# Patient Record
Sex: FEMALE | Race: WHITE | NOT HISPANIC OR LATINO | Employment: FULL TIME | URBAN - METROPOLITAN AREA
[De-identification: names, ages, dates, MRNs, and addresses within clinical notes are randomized per-mention and may not be internally consistent; named-entity substitution may affect disease eponyms.]

---

## 2018-06-01 ENCOUNTER — OFFICE VISIT (OUTPATIENT)
Dept: FAMILY MEDICINE CLINIC | Facility: CLINIC | Age: 23
End: 2018-06-01

## 2018-06-01 VITALS
TEMPERATURE: 98.7 F | DIASTOLIC BLOOD PRESSURE: 62 MMHG | OXYGEN SATURATION: 98 % | HEIGHT: 66 IN | BODY MASS INDEX: 26.81 KG/M2 | SYSTOLIC BLOOD PRESSURE: 104 MMHG | WEIGHT: 166.8 LBS | HEART RATE: 91 BPM

## 2018-06-01 DIAGNOSIS — R53.82 CHRONIC FATIGUE: ICD-10-CM

## 2018-06-01 DIAGNOSIS — Z13.220 SCREENING FOR CHOLESTEROL LEVEL: ICD-10-CM

## 2018-06-01 DIAGNOSIS — N92.0 MENORRHAGIA WITH REGULAR CYCLE: ICD-10-CM

## 2018-06-01 DIAGNOSIS — R73.9 HYPERGLYCEMIA: ICD-10-CM

## 2018-06-01 DIAGNOSIS — L30.1 DYSHIDROTIC ECZEMA: ICD-10-CM

## 2018-06-01 DIAGNOSIS — E66.3 OVERWEIGHT (BMI 25.0-29.9): Primary | ICD-10-CM

## 2018-06-01 DIAGNOSIS — R09.89 CHEST CONGESTION: ICD-10-CM

## 2018-06-01 PROBLEM — J30.1 ACUTE SEASONAL ALLERGIC RHINITIS DUE TO POLLEN: Status: ACTIVE | Noted: 2018-06-01

## 2018-06-01 PROCEDURE — 99204 OFFICE O/P NEW MOD 45 MIN: CPT | Performed by: FAMILY MEDICINE

## 2018-06-01 RX ORDER — MONTELUKAST SODIUM 10 MG/1
10 TABLET ORAL NIGHTLY
Qty: 30 TABLET | Refills: 6 | OUTPATIENT
Start: 2018-06-01 | End: 2019-12-09

## 2018-06-01 NOTE — PROGRESS NOTES
Chrissie Varela is a 22 y.o. female.     Chief Complaint   Patient presents with   • Establish Care     New PT   • Asthma   • Allergies   • Fatigue   • Rash     On hands that come and go        HPI     Pt is a pleasant 22 y.o. YO female here for Asthma, Fatigue, allergies.  PMH includes Asthma and allergies.    Lungs/ Asthma: 2-3 times a year she gets a cold and it last for a month/ gets a walking PNA, needs steroids and an inhaler.  Currently she is asymptomatic, but has a family history of asthma.  As she gets very wheezy with a persistent cough, she wonders if she has an underlying mild asthma.  She has never had testing previously.    Never had a PAP smear    Allergies - main concern, very thick congestion, difficult to sleep.  Starts to cough and can't stop.  He is taking over-the-counter medications with some improvement.  Additionally she has noticed some food triggers, especially corn.    Always tired, always bloated, exercising - raining for a half marathon    Significant fatigue - feels tired, does not resolve after getting a good night sleep, some bloating and abdominal issues.    She gets a rash on her hands, intermittent for the last 3 years.  Very itchy, on the knuckles, and will crack and bleed.  His improved with topical steroids but often takes months to resolve.    The following portions of the patient's history were reviewed and updated as appropriate: allergies, current medications, past family history, past medical history, past social history, past surgical history and problem list.    Review of Systems   Constitutional: Negative for fever and unexpected weight change.   HENT: Negative for dental problem.    Respiratory: Negative for shortness of breath.    Cardiovascular: Negative for chest pain.   Gastrointestinal: Negative for blood in stool.   Genitourinary: Negative for dysuria.   Skin: Negative for rash.   Allergic/Immunologic: Negative for environmental allergies.   Neurological:  Negative for syncope.   Psychiatric/Behavioral: The patient is not nervous/anxious.        Objective  Vitals:    06/01/18 0839   BP: 104/62   Pulse: 91   Temp: 98.7 °F (37.1 °C)   SpO2: 98%        Physical Exam   Constitutional: She is oriented to person, place, and time. She appears well-developed and well-nourished. No distress.   HENT:   Head: Normocephalic.   Nose: Nose normal.   Eyes: EOM are normal.   Cardiovascular: Normal rate, regular rhythm, normal heart sounds and intact distal pulses.    No murmur heard.  Pulmonary/Chest: Effort normal and breath sounds normal. No respiratory distress.   Musculoskeletal: Normal range of motion.   Neurological: She is alert and oriented to person, place, and time.   Skin: Skin is warm and dry. No rash noted.   Psychiatric: She has a normal mood and affect. Her behavior is normal. Judgment and thought content normal.   Nursing note and vitals reviewed.        Current Outpatient Prescriptions:   •  betamethasone valerate (VALISONE) 0.1 % ointment, Apply  topically 2 (Two) Times a Day., Disp: 45 g, Rfl: 6  •  montelukast (SINGULAIR) 10 MG tablet, Take 1 tablet by mouth Every Night., Disp: 30 tablet, Rfl: 6    Procedures    Lab Results (most recent)     None              Chrissie was seen today for establish care, asthma, allergies, fatigue and rash.    Diagnoses and all orders for this visit:    Overweight (BMI 25.0-29.9)  -     Comprehensive Metabolic Panel    Hyperglycemia  -     Comprehensive Metabolic Panel  -     TSH Rfx On Abnormal To Free T4  -     Vitamin D 25 Hydroxy  -     Hemoglobin A1c    Dyshidrotic eczema  -     betamethasone valerate (VALISONE) 0.1 % ointment; Apply  topically 2 (Two) Times a Day.    Chest congestion    Chronic fatigue  -     TSH Rfx On Abnormal To Free T4  -     Vitamin D 25 Hydroxy  -     CBC Auto Differential    Screening for cholesterol level  -     Lipid Panel    Menorrhagia with regular cycle  -     CBC Auto Differential    Other orders  -      montelukast (SINGULAIR) 10 MG tablet; Take 1 tablet by mouth Every Night.    Patient is a pleasant 22-year-old female here as a new patient.  She has multiple complaints.  Evaluation as above.  Follow-up for internal wellness exam with Pap smear.    Dyshidrotic eczema: Patient to use occlusion therapy with glove at night with betamethasone and coconut oil or Vaseline.    Chronic congestion with possible asthmatic tendencies: Start montelukast, if not improving in 2 weeks may stop.  If not improving significantly with that we may consider pulmonary function testing.    Patient with a history of hyperglycemia, follow-up today.    Return if symptoms worsen or fail to improve, for Annual physical with PAP and labs prior .      Cookie Molina MD

## 2018-06-08 LAB
25(OH)D3+25(OH)D2 SERPL-MCNC: 22.9 NG/ML (ref 30–100)
ALBUMIN SERPL-MCNC: 4.8 G/DL (ref 3.5–5.2)
ALBUMIN/GLOB SERPL: 1.8 G/DL
ALP SERPL-CCNC: 49 U/L (ref 39–117)
ALT SERPL-CCNC: 17 U/L (ref 1–33)
AST SERPL-CCNC: 17 U/L (ref 1–32)
BASOPHILS # BLD AUTO: 0.01 10*3/MM3 (ref 0–0.2)
BASOPHILS NFR BLD AUTO: 0.3 % (ref 0–1.5)
BILIRUB SERPL-MCNC: 0.5 MG/DL (ref 0.1–1.2)
BUN SERPL-MCNC: 11 MG/DL (ref 6–20)
BUN/CREAT SERPL: 13.3 (ref 7–25)
CALCIUM SERPL-MCNC: 9.8 MG/DL (ref 8.6–10.5)
CHLORIDE SERPL-SCNC: 102 MMOL/L (ref 98–107)
CHOLEST SERPL-MCNC: 166 MG/DL (ref 0–200)
CO2 SERPL-SCNC: 23.1 MMOL/L (ref 22–29)
CREAT SERPL-MCNC: 0.83 MG/DL (ref 0.57–1)
EOSINOPHIL # BLD AUTO: 0.16 10*3/MM3 (ref 0–0.7)
EOSINOPHIL NFR BLD AUTO: 4.2 % (ref 0.3–6.2)
ERYTHROCYTE [DISTWIDTH] IN BLOOD BY AUTOMATED COUNT: 13.1 % (ref 11.7–13)
GFR SERPLBLD CREATININE-BSD FMLA CKD-EPI: 103 ML/MIN/1.73
GFR SERPLBLD CREATININE-BSD FMLA CKD-EPI: 85 ML/MIN/1.73
GLOBULIN SER CALC-MCNC: 2.7 GM/DL
GLUCOSE SERPL-MCNC: 79 MG/DL (ref 65–99)
HBA1C MFR BLD: 4.86 % (ref 4.8–5.6)
HCT VFR BLD AUTO: 39.4 % (ref 35.6–45.5)
HDLC SERPL-MCNC: 46 MG/DL (ref 40–60)
HGB BLD-MCNC: 12.9 G/DL (ref 11.9–15.5)
IMM GRANULOCYTES # BLD: 0 10*3/MM3 (ref 0–0.03)
IMM GRANULOCYTES NFR BLD: 0 % (ref 0–0.5)
LDLC SERPL CALC-MCNC: 110 MG/DL (ref 0–100)
LYMPHOCYTES # BLD AUTO: 1.84 10*3/MM3 (ref 0.9–4.8)
LYMPHOCYTES NFR BLD AUTO: 47.9 % (ref 19.6–45.3)
MCH RBC QN AUTO: 29.1 PG (ref 26.9–32)
MCHC RBC AUTO-ENTMCNC: 32.7 G/DL (ref 32.4–36.3)
MCV RBC AUTO: 88.7 FL (ref 80.5–98.2)
MONOCYTES # BLD AUTO: 0.35 10*3/MM3 (ref 0.2–1.2)
MONOCYTES NFR BLD AUTO: 9.1 % (ref 5–12)
NEUTROPHILS # BLD AUTO: 1.48 10*3/MM3 (ref 1.9–8.1)
NEUTROPHILS NFR BLD AUTO: 38.5 % (ref 42.7–76)
PLATELET # BLD AUTO: 229 10*3/MM3 (ref 140–500)
POTASSIUM SERPL-SCNC: 4.2 MMOL/L (ref 3.5–5.2)
PROT SERPL-MCNC: 7.5 G/DL (ref 6–8.5)
RBC # BLD AUTO: 4.44 10*6/MM3 (ref 3.9–5.2)
SODIUM SERPL-SCNC: 140 MMOL/L (ref 136–145)
TRIGL SERPL-MCNC: 48 MG/DL (ref 0–150)
TSH SERPL DL<=0.005 MIU/L-ACNC: 3.15 MIU/ML (ref 0.27–4.2)
VLDLC SERPL CALC-MCNC: 9.6 MG/DL (ref 5–40)
WBC # BLD AUTO: 3.84 10*3/MM3 (ref 4.5–10.7)

## 2018-06-11 NOTE — PROGRESS NOTES
Please call patient with results. Kidney and liver functions look normal. Diabetes screen is negative. Cholesterol is normal. Thyroid function is normal,  No anemia, Low Vit D - please take 2000 units OTC daily.  We can repeat these next year.     Thank You,    Cookie Molina M.D.

## 2018-06-13 ENCOUNTER — PROCEDURE VISIT (OUTPATIENT)
Dept: FAMILY MEDICINE CLINIC | Facility: CLINIC | Age: 23
End: 2018-06-13

## 2018-06-13 VITALS
HEIGHT: 66 IN | DIASTOLIC BLOOD PRESSURE: 78 MMHG | SYSTOLIC BLOOD PRESSURE: 120 MMHG | OXYGEN SATURATION: 98 % | TEMPERATURE: 98 F | WEIGHT: 160 LBS | BODY MASS INDEX: 25.71 KG/M2 | HEART RATE: 78 BPM

## 2018-06-13 DIAGNOSIS — Z12.4 PAP SMEAR FOR CERVICAL CANCER SCREENING: ICD-10-CM

## 2018-06-13 DIAGNOSIS — N89.8 VAGINAL DISCHARGE: ICD-10-CM

## 2018-06-13 DIAGNOSIS — L30.9 DERMATITIS: ICD-10-CM

## 2018-06-13 DIAGNOSIS — Z00.00 HEALTH MAINTENANCE EXAMINATION: Primary | ICD-10-CM

## 2018-06-13 PROCEDURE — 99395 PREV VISIT EST AGE 18-39: CPT | Performed by: FAMILY MEDICINE

## 2018-06-13 NOTE — PROGRESS NOTES
Chrissie Varela is a 22 y.o. female.     Chief Complaint   Patient presents with   • Gynecologic Exam     and follow up on labs no complains sometimes discharge       HPI     Pt is a pleasant 22 y.o. YO female here for annual physical and PAP smear.  PMH includes allergies and hyperglycemia.  Sting labs performed earlier this month with normal findings except for a low vitamin D.  Reviewed with patient.  Due for Pap smear, she has never had a Pap smear previously.  Some odorous vaginal discharge, baths daily.  Manganous with her .  Only one lifetime partner.       The following portions of the patient's history were reviewed and updated as appropriate: allergies, current medications, past family history, past medical history, past social history, past surgical history and problem list.    Review of Systems   Constitutional: Negative for fever and unexpected weight change.   HENT: Negative for dental problem.    Respiratory: Negative for shortness of breath.    Cardiovascular: Negative for chest pain.   Gastrointestinal: Negative for blood in stool.   Genitourinary: Negative for dysuria.   Skin: Negative for rash.   Allergic/Immunologic: Negative for environmental allergies.   Neurological: Negative for syncope.   Psychiatric/Behavioral: The patient is not nervous/anxious.    All other systems reviewed and are negative.      Objective  Vitals:    06/13/18 1009   BP: 120/78   Pulse: 78   Temp: 98 °F (36.7 °C)   SpO2: 98%        Physical Exam   Constitutional: She is oriented to person, place, and time. She appears well-developed and well-nourished.   Neck: No thyromegaly present.   Cardiovascular: Normal rate, regular rhythm and normal heart sounds.  Exam reveals no gallop.    No murmur heard.  Pulmonary/Chest: Effort normal and breath sounds normal. She has no wheezes. She has no rales. She exhibits no tenderness. Right breast exhibits no mass, no nipple discharge and no skin change. Left breast exhibits no  mass, no nipple discharge and no skin change.   Abdominal: There is no tenderness.   Genitourinary: Uterus normal. Pelvic exam was performed with patient supine. There is no rash, tenderness or lesion on the right labia. There is no rash, tenderness or lesion on the left labia. Uterus is not enlarged and not tender. Cervix exhibits discharge. Cervix exhibits no motion tenderness and no friability. Right adnexum displays no mass, no tenderness and no fullness. Left adnexum displays no mass, no tenderness and no fullness. No erythema, tenderness or bleeding in the vagina. Vaginal discharge found.   Genitourinary Comments: Dark brown colored discharge with a fishy odor.    Lymphadenopathy:        Right: No inguinal adenopathy present.        Left: No inguinal adenopathy present.   Neurological: She is alert and oriented to person, place, and time.   Skin: Skin is warm. No rash noted.   Erythematous dermatitis of the belly button, no bleeding or descharge.    Psychiatric: She has a normal mood and affect. Her behavior is normal. Judgment and thought content normal.   Vitals reviewed.        Current Outpatient Prescriptions:   •  betamethasone valerate (VALISONE) 0.1 % ointment, Apply  topically 2 (Two) Times a Day., Disp: 45 g, Rfl: 6  •  montelukast (SINGULAIR) 10 MG tablet, Take 1 tablet by mouth Every Night., Disp: 30 tablet, Rfl: 6  •  nystatin-triamcinolone (MYCOLOG II) 251304-8.1 UNIT/GM-% cream, Apply  topically 2 (Two) Times a Day., Disp: 30 g, Rfl: 2    Procedures    Lab Results (most recent)     Lindy Dickens was seen today for gynecologic exam.    Diagnoses and all orders for this visit:    Health maintenance examination    Pap smear for cervical cancer screening    Vaginal discharge  -     NuSwab BV & Candida - Swab, Vagina    Dermatitis  -     nystatin-triamcinolone (MYCOLOG II) 460282-5.1 UNIT/GM-% cream; Apply  topically 2 (Two) Times a Day.    Smear today, reviewed labs.  Start vitamin D  supplementation 2000 g daily.  BV/ yeast for vaginal discharge as above.  Some dermatitis in the belly button, start taking cream BID for 10 days as above.      Return if symptoms worsen or fail to improve.      Cookie Molina MD

## 2018-06-15 LAB
A VAGINAE DNA VAG QL NAA+PROBE: NORMAL SCORE
BVAB2 DNA VAG QL NAA+PROBE: NORMAL SCORE
C ALBICANS DNA VAG QL NAA+PROBE: NEGATIVE
C GLABRATA DNA VAG QL NAA+PROBE: NEGATIVE
MEGA1 DNA VAG QL NAA+PROBE: NORMAL SCORE

## 2018-06-19 LAB
CONV .: ABNORMAL
CYTOLOGIST CVX/VAG CYTO: ABNORMAL
CYTOLOGY CVX/VAG DOC THIN PREP: ABNORMAL
DX ICD CODE: ABNORMAL
DX ICD CODE: ABNORMAL
HIV 1 & 2 AB SER-IMP: ABNORMAL
HPV I/H RISK 1 DNA CVX QL PROBE+SIG AMP: NEGATIVE
OTHER STN SPEC: ABNORMAL
PATH REPORT.FINAL DX SPEC: ABNORMAL
PATHOLOGIST CVX/VAG CYTO: ABNORMAL
STAT OF ADQ CVX/VAG CYTO-IMP: ABNORMAL

## 2019-01-08 ENCOUNTER — OFFICE VISIT (OUTPATIENT)
Dept: FAMILY MEDICINE CLINIC | Facility: CLINIC | Age: 24
End: 2019-01-08

## 2019-01-08 VITALS
BODY MASS INDEX: 28.28 KG/M2 | SYSTOLIC BLOOD PRESSURE: 120 MMHG | HEIGHT: 66 IN | DIASTOLIC BLOOD PRESSURE: 62 MMHG | HEART RATE: 100 BPM | WEIGHT: 176 LBS | OXYGEN SATURATION: 99 % | TEMPERATURE: 98.7 F

## 2019-01-08 DIAGNOSIS — L50.9 URTICARIA: Primary | ICD-10-CM

## 2019-01-08 DIAGNOSIS — J30.1 ACUTE SEASONAL ALLERGIC RHINITIS DUE TO POLLEN: ICD-10-CM

## 2019-01-08 PROCEDURE — 99213 OFFICE O/P EST LOW 20 MIN: CPT | Performed by: FAMILY MEDICINE

## 2019-01-08 NOTE — PROGRESS NOTES
Chrissie Varela is a 23 y.o. female.     Chief Complaint   Patient presents with   • Allergies     Pt would like to be tested for allerys    • Rash       HPI     Pt is a pleasant 23 y.o. YO female here for allergies and rash. These have been present for a few months, she has episodic urticaria that lasts for a couple hours and is present over her trunk and arms and chest.  It is pruritic and often red with raised lesions.  She has not found factor that triggers the rash.  She does have allergies      The following portions of the patient's history were reviewed and updated as appropriate: allergies, current medications, past family history, past medical history, past social history, past surgical history and problem list.    Review of Systems   Skin: Positive for rash.   Psychiatric/Behavioral: Negative for behavioral problems.       Objective  Vitals:    01/08/19 1300   BP: 120/62   Pulse: 100   Temp: 98.7 °F (37.1 °C)   SpO2: 99%        Physical Exam   Constitutional: She is oriented to person, place, and time. She appears well-developed and well-nourished. No distress.   HENT:   Head: Normocephalic.   Nose: Nose normal.   Eyes: EOM are normal. No scleral icterus.   Pulmonary/Chest: Effort normal. No respiratory distress.   Musculoskeletal: Normal range of motion.   Neurological: She is alert and oriented to person, place, and time.   Skin: Skin is warm and dry. No rash noted.   Rash currently, some erythematous patches with raised lesion seen on imaging from phone.   Psychiatric: She has a normal mood and affect. Her behavior is normal. Judgment and thought content normal.   Nursing note and vitals reviewed.        Current Outpatient Medications:   •  betamethasone valerate (VALISONE) 0.1 % ointment, Apply  topically 2 (Two) Times a Day., Disp: 45 g, Rfl: 6  •  montelukast (SINGULAIR) 10 MG tablet, Take 1 tablet by mouth Every Night., Disp: 30 tablet, Rfl: 6  •  nystatin-triamcinolone (MYCOLOG II) 715808-4.1  UNIT/GM-% cream, Apply  topically 2 (Two) Times a Day., Disp: 30 g, Rfl: 2  •  VITAMIN D, CHOLECALCIFEROL, PO, Take  by mouth., Disp: , Rfl:     Procedures    Lab Results (most recent)     None              Chrissie was seen today for allergies and rash.    Diagnoses and all orders for this visit:    Urticaria  -     Ambulatory Referral to Allergy    Acute seasonal allergic rhinitis due to pollen  -     Ambulatory Referral to Allergy    Patient was urticaria and allergies, referral to allergist.  Recommend Zyrtec and Benadryl to treat symptoms.  Avoid items that could trigger allergic reaction, no identified options currently.  She was told that she was allergic to corn in the past, eating currently without symptoms.      Return if symptoms worsen or fail to improve.      Cookie Molina MD

## 2019-12-09 ENCOUNTER — HOSPITAL ENCOUNTER (EMERGENCY)
Facility: HOSPITAL | Age: 24
Discharge: HOME | End: 2019-12-09
Attending: EMERGENCY MEDICINE | Admitting: EMERGENCY MEDICINE

## 2019-12-09 ENCOUNTER — APPOINTMENT (OUTPATIENT)
Dept: GENERAL RADIOLOGY | Facility: HOSPITAL | Age: 24
End: 2019-12-09

## 2019-12-09 VITALS
HEART RATE: 69 BPM | OXYGEN SATURATION: 99 % | RESPIRATION RATE: 16 BRPM | SYSTOLIC BLOOD PRESSURE: 111 MMHG | TEMPERATURE: 97.9 F | DIASTOLIC BLOOD PRESSURE: 82 MMHG

## 2019-12-09 DIAGNOSIS — R07.89 ATYPICAL CHEST PAIN: Primary | ICD-10-CM

## 2019-12-09 LAB
ALBUMIN SERPL-MCNC: 4.7 G/DL (ref 3.5–5.2)
ALBUMIN/GLOB SERPL: 1.5 G/DL
ALP SERPL-CCNC: 47 U/L (ref 39–117)
ALT SERPL W P-5'-P-CCNC: 21 U/L (ref 1–33)
ANION GAP SERPL CALCULATED.3IONS-SCNC: 11.7 MMOL/L (ref 5–15)
AST SERPL-CCNC: 16 U/L (ref 1–32)
BASOPHILS # BLD AUTO: 0.02 10*3/MM3 (ref 0–0.2)
BASOPHILS NFR BLD AUTO: 0.4 % (ref 0–1.5)
BILIRUB SERPL-MCNC: 0.3 MG/DL (ref 0.2–1.2)
BUN BLD-MCNC: 11 MG/DL (ref 6–20)
BUN/CREAT SERPL: 16.9 (ref 7–25)
CALCIUM SPEC-SCNC: 9.5 MG/DL (ref 8.6–10.5)
CHLORIDE SERPL-SCNC: 109 MMOL/L (ref 98–107)
CO2 SERPL-SCNC: 22.3 MMOL/L (ref 22–29)
CREAT BLD-MCNC: 0.65 MG/DL (ref 0.57–1)
D DIMER PPP FEU-MCNC: 0.28 MCGFEU/ML (ref 0–0.49)
DEPRECATED RDW RBC AUTO: 39.7 FL (ref 37–54)
EOSINOPHIL # BLD AUTO: 0.24 10*3/MM3 (ref 0–0.4)
EOSINOPHIL NFR BLD AUTO: 4.8 % (ref 0.3–6.2)
ERYTHROCYTE [DISTWIDTH] IN BLOOD BY AUTOMATED COUNT: 12.5 % (ref 12.3–15.4)
GFR SERPL CREATININE-BSD FRML MDRD: 111 ML/MIN/1.73
GLOBULIN UR ELPH-MCNC: 3.1 GM/DL
GLUCOSE BLD-MCNC: 82 MG/DL (ref 65–99)
HCT VFR BLD AUTO: 36.8 % (ref 34–46.6)
HGB BLD-MCNC: 12.7 G/DL (ref 12–15.9)
IMM GRANULOCYTES # BLD AUTO: 0.01 10*3/MM3 (ref 0–0.05)
IMM GRANULOCYTES NFR BLD AUTO: 0.2 % (ref 0–0.5)
LYMPHOCYTES # BLD AUTO: 1.67 10*3/MM3 (ref 0.7–3.1)
LYMPHOCYTES NFR BLD AUTO: 33.7 % (ref 19.6–45.3)
MCH RBC QN AUTO: 30.3 PG (ref 26.6–33)
MCHC RBC AUTO-ENTMCNC: 34.5 G/DL (ref 31.5–35.7)
MCV RBC AUTO: 87.8 FL (ref 79–97)
MONOCYTES # BLD AUTO: 0.37 10*3/MM3 (ref 0.1–0.9)
MONOCYTES NFR BLD AUTO: 7.5 % (ref 5–12)
NEUTROPHILS # BLD AUTO: 2.65 10*3/MM3 (ref 1.7–7)
NEUTROPHILS NFR BLD AUTO: 53.4 % (ref 42.7–76)
NRBC BLD AUTO-RTO: 0 /100 WBC (ref 0–0.2)
PLATELET # BLD AUTO: 234 10*3/MM3 (ref 140–450)
PMV BLD AUTO: 10.2 FL (ref 6–12)
POTASSIUM BLD-SCNC: 4.1 MMOL/L (ref 3.5–5.2)
PROT SERPL-MCNC: 7.8 G/DL (ref 6–8.5)
RBC # BLD AUTO: 4.19 10*6/MM3 (ref 3.77–5.28)
SODIUM BLD-SCNC: 143 MMOL/L (ref 136–145)
TROPONIN T SERPL-MCNC: <0.01 NG/ML (ref 0–0.03)
WBC NRBC COR # BLD: 4.96 10*3/MM3 (ref 3.4–10.8)

## 2019-12-09 PROCEDURE — 71046 X-RAY EXAM CHEST 2 VIEWS: CPT

## 2019-12-09 PROCEDURE — 93005 ELECTROCARDIOGRAM TRACING: CPT | Performed by: NURSE PRACTITIONER

## 2019-12-09 PROCEDURE — 99283 EMERGENCY DEPT VISIT LOW MDM: CPT

## 2019-12-09 PROCEDURE — 84484 ASSAY OF TROPONIN QUANT: CPT | Performed by: NURSE PRACTITIONER

## 2019-12-09 PROCEDURE — 85379 FIBRIN DEGRADATION QUANT: CPT | Performed by: NURSE PRACTITIONER

## 2019-12-09 PROCEDURE — 80053 COMPREHEN METABOLIC PANEL: CPT | Performed by: NURSE PRACTITIONER

## 2019-12-09 PROCEDURE — 85025 COMPLETE CBC W/AUTO DIFF WBC: CPT | Performed by: NURSE PRACTITIONER

## 2019-12-09 PROCEDURE — 93010 ELECTROCARDIOGRAM REPORT: CPT | Performed by: INTERNAL MEDICINE

## 2019-12-09 NOTE — DISCHARGE INSTRUCTIONS
Continue current home medications  Follow-up with your PMD as needed  Return to the ER for fever, chills, shortness of breath, chest pain, leg swelling or any new or worsening symptoms

## 2019-12-09 NOTE — ED PROVIDER NOTES
"  EMERGENCY DEPARTMENT ENCOUNTER    CHIEF COMPLAINT  Chief Complaint: chest pain  History given by: pt  History limited by: nothing  Time Seen: 1129  Room Number: 42/42  PMD: Cookie Molina MD      HPI:  Pt is a 24 y.o. female who presents with c/o intermittent, worsening, mild to moderate L sided upper chest pain and heaviness, starting 3 days ago. The pain is episodic, and lasts for seconds to a few minutes at time. Patient also complains of nause, and mild \"cramping\" abdominal pain.  Patient denies vomiting, diaphoresis, SOA, or pedal edema. No relief of chest pain with Ibuprofen, but it did improve her abdominal pain. Pt is currently menstruation. Denies taking oral contraceptives or a immediate family cardiac history. Pt does report driving to [place], [place] two weeks ago.   Past Medical History of seasonal allergies.    PAST MEDICAL HISTORY  Active Ambulatory Problems     Diagnosis Date Noted   • Acute seasonal allergic rhinitis due to pollen 06/01/2018   • Hyperglycemia 06/01/2018   • Overweight (BMI 25.0-29.9) 06/01/2018     Resolved Ambulatory Problems     Diagnosis Date Noted   • No Resolved Ambulatory Problems     Past Medical History:   Diagnosis Date   • Asthma    • Seasonal allergies        PAST SURGICAL HISTORY  History reviewed. No pertinent surgical history.    FAMILY HISTORY  Family History   Problem Relation Age of Onset   • Asthma Mother    • Colon cancer Paternal Grandfather    • No Known Problems Father    • No Known Problems Sister    • No Known Problems Brother    • No Known Problems Paternal Grandmother         90s   • No Known Problems Brother    • No Known Problems Brother    • No Known Problems Sister        SOCIAL HISTORY  Social History     Socioeconomic History   • Marital status:      Spouse name: Not on file   • Number of children: Not on file   • Years of education: Not on file   • Highest education level: Not on file   Tobacco Use   • Smoking status: Never Smoker   • " "Smokeless tobacco: Never Used   Substance and Sexual Activity   • Alcohol use: Yes     Comment: occasional   • Drug use: No   • Sexual activity: Yes     Partners: Male     Birth control/protection: Condom     Comment:          ALLERGIES  Patient has no known allergies.    REVIEW OF SYSTEMS  Review of Systems   Constitutional: Negative for chills and fever.   HENT: Negative for sore throat.    Respiratory: Negative for shortness of breath.    Cardiovascular: Positive for chest pain (and heaviness). Negative for leg swelling.   Gastrointestinal: Positive for abdominal pain (\"cramping\") and nausea. Negative for vomiting.   Genitourinary: Negative for dysuria.   Musculoskeletal: Negative for back pain.   Skin: Negative for rash.   Neurological: Negative for dizziness.   Psychiatric/Behavioral: The patient is not nervous/anxious.        PHYSICAL EXAM  ED Triage Vitals   Temp Heart Rate Resp BP SpO2   12/09/19 1041 12/09/19 1041 12/09/19 1041 12/09/19 1117 12/09/19 1041   97.9 °F (36.6 °C) 103 16 128/87 96 %       Physical Exam   Constitutional: She is well-developed, well-nourished, and in no distress.   HENT:   Head: Normocephalic.   Mouth/Throat: Mucous membranes are normal.   Eyes: No scleral icterus.   Neck: Normal range of motion.   Cardiovascular: Normal rate, regular rhythm and normal heart sounds.   Pulmonary/Chest: Effort normal and breath sounds normal.   Musculoskeletal: Normal range of motion.   Neurological: She is alert.   Skin: Skin is warm and dry.   Psychiatric: Mood and affect normal.   Nursing note and vitals reviewed.      LAB RESULTS  Recent Results (from the past 24 hour(s))   Comprehensive Metabolic Panel    Collection Time: 12/09/19 11:53 AM   Result Value Ref Range    Glucose 82 65 - 99 mg/dL    BUN 11 6 - 20 mg/dL    Creatinine 0.65 0.57 - 1.00 mg/dL    Sodium 143 136 - 145 mmol/L    Potassium 4.1 3.5 - 5.2 mmol/L    Chloride 109 (H) 98 - 107 mmol/L    CO2 22.3 22.0 - 29.0 mmol/L    " Calcium 9.5 8.6 - 10.5 mg/dL    Total Protein 7.8 6.0 - 8.5 g/dL    Albumin 4.70 3.50 - 5.20 g/dL    ALT (SGPT) 21 1 - 33 U/L    AST (SGOT) 16 1 - 32 U/L    Alkaline Phosphatase 47 39 - 117 U/L    Total Bilirubin 0.3 0.2 - 1.2 mg/dL    eGFR Non African Amer 111 >60 mL/min/1.73    Globulin 3.1 gm/dL    A/G Ratio 1.5 g/dL    BUN/Creatinine Ratio 16.9 7.0 - 25.0    Anion Gap 11.7 5.0 - 15.0 mmol/L   Troponin    Collection Time: 12/09/19 11:53 AM   Result Value Ref Range    Troponin T <0.010 0.000 - 0.030 ng/mL   D-dimer, Quantitative    Collection Time: 12/09/19 11:53 AM   Result Value Ref Range    D-Dimer, Quantitative 0.28 0.00 - 0.49 MCGFEU/mL   CBC Auto Differential    Collection Time: 12/09/19 11:53 AM   Result Value Ref Range    WBC 4.96 3.40 - 10.80 10*3/mm3    RBC 4.19 3.77 - 5.28 10*6/mm3    Hemoglobin 12.7 12.0 - 15.9 g/dL    Hematocrit 36.8 34.0 - 46.6 %    MCV 87.8 79.0 - 97.0 fL    MCH 30.3 26.6 - 33.0 pg    MCHC 34.5 31.5 - 35.7 g/dL    RDW 12.5 12.3 - 15.4 %    RDW-SD 39.7 37.0 - 54.0 fl    MPV 10.2 6.0 - 12.0 fL    Platelets 234 140 - 450 10*3/mm3    Neutrophil % 53.4 42.7 - 76.0 %    Lymphocyte % 33.7 19.6 - 45.3 %    Monocyte % 7.5 5.0 - 12.0 %    Eosinophil % 4.8 0.3 - 6.2 %    Basophil % 0.4 0.0 - 1.5 %    Immature Grans % 0.2 0.0 - 0.5 %    Neutrophils, Absolute 2.65 1.70 - 7.00 10*3/mm3    Lymphocytes, Absolute 1.67 0.70 - 3.10 10*3/mm3    Monocytes, Absolute 0.37 0.10 - 0.90 10*3/mm3    Eosinophils, Absolute 0.24 0.00 - 0.40 10*3/mm3    Basophils, Absolute 0.02 0.00 - 0.20 10*3/mm3    Immature Grans, Absolute 0.01 0.00 - 0.05 10*3/mm3    nRBC 0.0 0.0 - 0.2 /100 WBC       I ordered the above labs and reviewed the results    RADIOLOGY  XR Chest 2 View   Final Result   1. No acute process.       This report was finalized on 12/9/2019 12:44 PM by Dr. Donnell Lora M.D.              I ordered the above noted radiological studies and reviewed the images on the PACS system.        EKG    ekg was  interpreted by Dr. Flores, see Dr. Flores's note for interpretation.      PROCEDURES  HEART SCORE    History Slightly or non-suspicious (0)  ECG Normal (0)  Age < or = 45 (0)  Risk factors No risk factors (0)  Troponin < or = Normal limit (0)    This patient's HEART score is 0    HEART Score Key:  Scores 0-3: 0.9-1.7% risk of adverse cardiac event. In the HEART Score study, these patients were discharged (0.99% in the retrospective study, 1.7% in the prospective study)  Scores 4-6: 12-16.6% risk of adverse cardiac event. In the HEART Score study, these patients were admitted to the hospital. (11.6% retrospective, 16.6% prospective)  Scores ?7: 50-65% risk of adverse cardiac event. In the HEART Score study, these patients were candidates for early invasive measures. (65.2% retrospective, 50.1% prospective)        PROGRESS AND CONSULTS  ED Course as of Dec 09 1451   Mon Dec 09, 2019   1301 D-Dimer, Quant: 0.28 [TD]   1301 Troponin T: <0.010 [TD]   1302 WBC: 4.96 [TD]   1302 Creatinine: 0.65 [TD]   1302 Sodium: 143 [TD]   1302 Potassium: 4.1 [TD]   1303 EKG interpreted by myself.  Time 12:04 PM.  Sinus rhythm.  Heart rate 69.  Normal intervals and axis.  No acute ST abnormalities.    [TD]      ED Course User Index  [TD] Micah Flores II, MD     1129- HR 75. O2 sat 99% on RA. /87.  Discussed with patient and family plan to obtain labs, EKG, and imaging of chest. Pt understands and agrees with the plan, all questions answered. Ordered labs, EKG, and XR Chest for further evaluation.     1302- Reviewed pt's history and workup with Dr. Flores.  At bedside evaluation, they agree with the plan of care.    1304- Reviewed implications of results, diagnosis, meds, responsibility to follow up, warning signs and symptoms of possible worsening, potential complications and reasons to return to ER with patient.  Discussed all results and noted any abnormalities with patient.  Discussed absolute need to recheck abnormalities  with PCP.    Discussed plan for discharge, as there is no emergent indication for admission.  Pt is agreeable and understands need for follow up and repeat testing.  Pt is aware that discharge does not mean that nothing is wrong but it indicates no emergency is present.  Pt is discharged with instructions to follow up with primary care doctor to have their blood pressure rechecked.       DIAGNOSIS  Final diagnoses:   Atypical chest pain       FOLLOW UP   Cookie Molina MD  [address] [address]  [phone]    Today          COURSE & MEDICAL DECISION MAKING  Pertinent Labs and Imaging studies that were ordered and reviewed are noted above.  Results were reviewed/discussed with the patient and they were also made aware of online assess.   Pt also made aware that some labs, such as cultures, will not be resulted during ER visit and follow up with PMD is necessary.     MEDICATIONS GIVEN IN ER  Medications - No data to display    /82 (BP Location: Right arm, Patient Position: Lying)   Pulse 69   Temp 97.9 °F (36.6 °C) (Tympanic)   Resp 16   LMP 12/07/2019   SpO2 99%       I personally reviewed the past medical history, past surgical history, social history, family history, current medications and allergies as they appear in this chart.  The scribe's note accurately reflects the work and decisions made by me.     Documentation assistance provided by kelli Simmons for LACY Greoc on 12/9/2019 at 11:29 AM. Information recorded by the scribe was done at my direction and has been verified and validated by me.     Cynthia Simmons  12/09/19 3704       Marium Shrestha, RIANA  12/09/19 2393

## 2019-12-09 NOTE — ED PROVIDER NOTES
MD ATTESTATION NOTE    The EARNEST and I have discussed this patient's history, physical exam, and treatment plan.  I have reviewed the documentation and personally had a face to face interaction with the patient. I affirm the documentation and agree with the treatment and plan.  The attached note describes my personal findings.      Chrissie Varela is a 24 y.o. female who presents to the ED c/o intermittent chest pain.  It is in the left upper part of her chest.  It has been ongoing for 2 days.  Last for less than 1 minute and oftentimes is for a few seconds.  She describes it as pressure in character.  No fever, cough, shortness of breath, vomiting, diaphoresis.  No family history of early CAD.  She has no known medical problems.  She does not smoke.      On exam:  To auscultation bilaterally  Regular rate and rhythm, 2+ radial pulses bilaterally  No chest wall tenderness soft nontender  No lower extremity edema or tenderness    Labs  Recent Results (from the past 24 hour(s))   Comprehensive Metabolic Panel    Collection Time: 12/09/19 11:53 AM   Result Value Ref Range    Glucose 82 65 - 99 mg/dL    BUN 11 6 - 20 mg/dL    Creatinine 0.65 0.57 - 1.00 mg/dL    Sodium 143 136 - 145 mmol/L    Potassium 4.1 3.5 - 5.2 mmol/L    Chloride 109 (H) 98 - 107 mmol/L    CO2 22.3 22.0 - 29.0 mmol/L    Calcium 9.5 8.6 - 10.5 mg/dL    Total Protein 7.8 6.0 - 8.5 g/dL    Albumin 4.70 3.50 - 5.20 g/dL    ALT (SGPT) 21 1 - 33 U/L    AST (SGOT) 16 1 - 32 U/L    Alkaline Phosphatase 47 39 - 117 U/L    Total Bilirubin 0.3 0.2 - 1.2 mg/dL    eGFR Non African Amer 111 >60 mL/min/1.73    Globulin 3.1 gm/dL    A/G Ratio 1.5 g/dL    BUN/Creatinine Ratio 16.9 7.0 - 25.0    Anion Gap 11.7 5.0 - 15.0 mmol/L   Troponin    Collection Time: 12/09/19 11:53 AM   Result Value Ref Range    Troponin T <0.010 0.000 - 0.030 ng/mL   D-dimer, Quantitative    Collection Time: 12/09/19 11:53 AM   Result Value Ref Range    D-Dimer, Quantitative 0.28 0.00 - 0.49  MCGFEU/mL   CBC Auto Differential    Collection Time: 12/09/19 11:53 AM   Result Value Ref Range    WBC 4.96 3.40 - 10.80 10*3/mm3    RBC 4.19 3.77 - 5.28 10*6/mm3    Hemoglobin 12.7 12.0 - 15.9 g/dL    Hematocrit 36.8 34.0 - 46.6 %    MCV 87.8 79.0 - 97.0 fL    MCH 30.3 26.6 - 33.0 pg    MCHC 34.5 31.5 - 35.7 g/dL    RDW 12.5 12.3 - 15.4 %    RDW-SD 39.7 37.0 - 54.0 fl    MPV 10.2 6.0 - 12.0 fL    Platelets 234 140 - 450 10*3/mm3    Neutrophil % 53.4 42.7 - 76.0 %    Lymphocyte % 33.7 19.6 - 45.3 %    Monocyte % 7.5 5.0 - 12.0 %    Eosinophil % 4.8 0.3 - 6.2 %    Basophil % 0.4 0.0 - 1.5 %    Immature Grans % 0.2 0.0 - 0.5 %    Neutrophils, Absolute 2.65 1.70 - 7.00 10*3/mm3    Lymphocytes, Absolute 1.67 0.70 - 3.10 10*3/mm3    Monocytes, Absolute 0.37 0.10 - 0.90 10*3/mm3    Eosinophils, Absolute 0.24 0.00 - 0.40 10*3/mm3    Basophils, Absolute 0.02 0.00 - 0.20 10*3/mm3    Immature Grans, Absolute 0.01 0.00 - 0.05 10*3/mm3    nRBC 0.0 0.0 - 0.2 /100 WBC       Radiology  Xr Chest 2 View    Result Date: 12/9/2019  XR CHEST 2 VW-  12/09/2019  HISTORY: Chest pain.  The heart size is within normal limits. Lungs appear free of acute infiltrates. Bones and soft tissues are unremarkable.      1. No acute process.  This report was finalized on 12/9/2019 12:44 PM by Dr. Donnell Lora M.D.        Medical Decision Making:  ED Course as of Dec 09 2250   Mon Dec 09, 2019   1301 D-Dimer, Quant: 0.28 [TD]   1301 Troponin T: <0.010 [TD]   1302 WBC: 4.96 [TD]   1302 Creatinine: 0.65 [TD]   1302 Sodium: 143 [TD]   1302 Potassium: 4.1 [TD]   1303 EKG interpreted by myself.  Time 12:04 PM.  Sinus rhythm.  Heart rate 69.  Normal intervals and axis.  No acute ST abnormalities.    [TD]      ED Course User Index  [TD] Micah Flores II, MD       DDx includes acute coronary syndrome, pulmonary embolism, thoracic aortic dissection, pneumonia, pneumothorax, musculoskeletal pain, GERD or esophageal spasm, anxiety,  myocarditis/pericarditis, esophageal rupture, pancreatitis.     History: 0  EC  Age: 0  Risk factors: 1    HEART score: 1    This is very atypical.  I do not believe that a second troponin is indicated.        Diagnosis  Final diagnoses:   Atypical chest pain        Micah Flores II, MD  [phone]

## 2019-12-09 NOTE — ED PROVIDER NOTES
EMERGENCY DEPARTMENT ENCOUNTER    CHIEF COMPLAINT  Chief Complaint: ***  History given by:***  History limited by:***  Time Seen: ***  Room Number: 42/42  PMD: Cookie Molina MD      HPI:  Pt is a 24 y.o. female who presents with***.  Patient also complains of ***.  Patient denies ***.  Past Medical History of ***    Duration: ***  Timing:***  Location:***  Radiation:***  Quality:***  Intensity/Severity:***  Progression:***  Associated Symptoms:***  Aggravating Factors:***  Alleviating Factors:***  Previous Episodes:***  Treatment before arrival:***    PAST MEDICAL HISTORY  Active Ambulatory Problems     Diagnosis Date Noted   • Acute seasonal allergic rhinitis due to pollen 06/01/2018   • Hyperglycemia 06/01/2018   • Overweight (BMI 25.0-29.9) 06/01/2018     Resolved Ambulatory Problems     Diagnosis Date Noted   • No Resolved Ambulatory Problems     Past Medical History:   Diagnosis Date   • Seasonal allergies        PAST SURGICAL HISTORY  No past surgical history on file.    FAMILY HISTORY  Family History   Problem Relation Age of Onset   • Asthma Mother    • Colon cancer Paternal Grandfather    • No Known Problems Father    • No Known Problems Sister    • No Known Problems Brother    • No Known Problems Paternal Grandmother         90s   • No Known Problems Brother    • No Known Problems Brother    • No Known Problems Sister        SOCIAL HISTORY  Social History     Socioeconomic History   • Marital status:      Spouse name: Not on file   • Number of children: Not on file   • Years of education: Not on file   • Highest education level: Not on file   Tobacco Use   • Smoking status: Never Smoker   • Smokeless tobacco: Never Used   Substance and Sexual Activity   • Alcohol use: Yes     Comment: rare   • Drug use: No   • Sexual activity: Yes     Partners: Male     Birth control/protection: Condom     Comment:          ALLERGIES  Patient has no known allergies.    REVIEW OF SYSTEMS  Review of  Systems    PHYSICAL EXAM  ED Triage Vitals   Temp Heart Rate Resp BP SpO2   12/09/19 1041 12/09/19 1041 12/09/19 1041 12/09/19 1117 12/09/19 1041   97.9 °F (36.6 °C) 103 16 128/87 96 %      Temp src Heart Rate Source Patient Position BP Location FiO2 (%)   12/09/19 1041 12/09/19 1041 12/09/19 1117 12/09/19 1117 --   Tympanic Monitor Lying Right arm        Physical Exam    LAB RESULTS  No results found for this or any previous visit (from the past 24 hour(s)).    I ordered the above labs and reviewed the results    RADIOLOGY  No orders to display       I ordered the above noted radiological studies and reviewed the images on the PACS system.  Spoke with  *** regarding CT scan results        EKG    ekg was interpreted by  ***, see  *** note for interpretation.      MEDICAL RECORD REVIEW    PROCEDURES      PROGRESS AND CONSULTS       ***- Reviewed pt's history and workup with  ***.  At bedside evaluation, they agree with the plan of care.    *** admit/discharge      COURSE & MEDICAL DECISION MAKING  Pertinent Labs and Imaging studies that were ordered and reviewed are noted above.  Results were reviewed/discussed with the patient and they were also made aware of online assess.   Pt also made aware that some labs, such as cultures, will not be resulted during ER visit and follow up with PMD is necessary.     MEDICATIONS GIVEN IN ER  Medications - No data to display    /87 (BP Location: Right arm, Patient Position: Lying)   Pulse 81   Temp 97.9 °F (36.6 °C) (Tympanic)   Resp 16   LMP 12/07/2019   SpO2 99%       I personally reviewed the past medical history, past surgical history, social history, family history, current medications and allergies as they appear in this chart.  The scribe's note accurately reflects the work and decisions made by me.     Documentation assistance provided by kelli Simmons for LACY Greco on 12/9/2019 at 11:19 AM. Information recorded by the  scribe was done at my direction and has been verified and validated by me.     Cynthia Simmons  12/09/19 112

## 2020-01-17 ENCOUNTER — OFFICE VISIT (OUTPATIENT)
Dept: FAMILY MEDICINE CLINIC | Facility: CLINIC | Age: 25
End: 2020-01-17

## 2020-01-17 VITALS
DIASTOLIC BLOOD PRESSURE: 72 MMHG | SYSTOLIC BLOOD PRESSURE: 116 MMHG | TEMPERATURE: 98.2 F | WEIGHT: 186 LBS | OXYGEN SATURATION: 98 % | HEART RATE: 97 BPM | HEIGHT: 69 IN | BODY MASS INDEX: 27.55 KG/M2

## 2020-01-17 DIAGNOSIS — L50.9 URTICARIA: ICD-10-CM

## 2020-01-17 DIAGNOSIS — Z00.00 HEALTH MAINTENANCE EXAMINATION: Primary | ICD-10-CM

## 2020-01-17 PROCEDURE — 99395 PREV VISIT EST AGE 18-39: CPT | Performed by: FAMILY MEDICINE

## 2020-01-17 NOTE — PROGRESS NOTES
Chrissie Varela is a 24 y.o. female.     Chief Complaint   Patient presents with   • Annual Exam     no complains    • Rash     pt has rash oll over body it comes and goes would like some advise   • Chest Pain     follow up from er pt think may be gerd       HPI     Pt is a pleasant 24 y.o. YO female here for Annual Exam.  Allergies with urticaria not well controlled followed by allergy.  Did whole 30 and it did not help.     Health Maintenance   Topic Date Due   • HPV VACCINES (1 - Female 2-dose series) 10/25/2005   • ANNUAL PHYSICAL  06/14/2019   • INFLUENZA VACCINE  08/01/2019   • TDAP/TD VACCINES (2 - Td) 05/03/2020   • PAP SMEAR  06/13/2021       Diet: eating healthy   Exercise: not exercising   GYN: uTd  Immunizations: UTD      The following portions of the patient's history were reviewed and updated as appropriate: allergies, current medications, past family history, past medical history, past social history, past surgical history and problem list.    Review of Systems   Constitutional: Negative.    HENT: Negative.    Eyes: Negative.    Respiratory: Negative.    Cardiovascular: Negative for chest pain, palpitations and leg swelling.   Gastrointestinal:        Gerd   Endocrine: Negative.    Genitourinary: Negative.    Musculoskeletal: Negative.    Skin: Positive for rash.   Allergic/Immunologic: Negative.    Neurological: Negative.    Hematological: Negative.    Psychiatric/Behavioral: Negative.        Objective  Vitals:    01/17/20 1519   BP: 116/72   Pulse: 97   Temp: 98.2 °F (36.8 °C)   SpO2: 98%        Physical Exam   Constitutional: She is oriented to person, place, and time. She appears well-developed and well-nourished. No distress.   HENT:   Head: Normocephalic.   Nose: Nose normal.   Eyes: EOM are normal.   Neck: No tracheal deviation present. No thyromegaly present.   No axillary lymphadenopathy.   Cardiovascular: Normal rate, regular rhythm, normal heart sounds and intact distal pulses.   No murmur  heard.  Pulmonary/Chest: Effort normal and breath sounds normal. No respiratory distress.   Musculoskeletal: Normal range of motion.   Neurological: She is alert and oriented to person, place, and time.   Skin: Skin is warm and dry. No rash noted.   Psychiatric: She has a normal mood and affect. Her behavior is normal. Judgment and thought content normal.   Nursing note and vitals reviewed.        Current Outpatient Medications:   •  betamethasone valerate (VALISONE) 0.1 % ointment, Apply  topically 2 (Two) Times a Day., Disp: 45 g, Rfl: 6  •  hydrocortisone 1 % cream, Apply  topically to the appropriate area as directed 2 (Two) Times a Day., Disp: , Rfl:   •  nystatin-triamcinolone (MYCOLOG II) 860259-8.1 UNIT/GM-% cream, Apply  topically 2 (Two) Times a Day., Disp: 30 g, Rfl: 2  •  VITAMIN D, CHOLECALCIFEROL, PO, Take  by mouth., Disp: , Rfl:     Procedures    Lab Results (most recent)     None              Chrissie was seen today for annual exam, rash and chest pain.    Diagnoses and all orders for this visit:    Health maintenance examination    Urticaria  -     Ambulatory Referral to Allergy    Patient here for annual exam, reassuring, GYN health up-to-date.  Labs reviewed from recent ER visit, normal CMP and CBC, no cause for cardiac pain.  Her symptoms have resolved.  She has had some breast tenderness but otherwise reassuring.  Follow-up if not improving.  She also has diffuse urticaria, referral to allergy as above.  No fasting labs indicated at this time, did encourage the patient to eat more vegetables, counseled her to start elevation diet to see if she can find a cause for urticaria.  Exercise regularly.  Immunizations up-to-date.      Return if symptoms worsen or fail to improve.      Cookie Molina MD